# Patient Record
Sex: MALE | URBAN - METROPOLITAN AREA
[De-identification: names, ages, dates, MRNs, and addresses within clinical notes are randomized per-mention and may not be internally consistent; named-entity substitution may affect disease eponyms.]

---

## 2020-06-01 ENCOUNTER — OFFICE VISIT (OUTPATIENT)
Dept: URBAN - METROPOLITAN AREA CLINIC 12 | Facility: CLINIC | Age: 80
End: 2020-06-01

## 2021-09-08 ENCOUNTER — OFFICE VISIT (OUTPATIENT)
Dept: URBAN - METROPOLITAN AREA CLINIC 23 | Facility: CLINIC | Age: 81
End: 2021-09-08

## 2021-09-09 ENCOUNTER — OFFICE VISIT (OUTPATIENT)
Dept: URBAN - METROPOLITAN AREA CLINIC 12 | Facility: CLINIC | Age: 81
End: 2021-09-09
Payer: MEDICARE

## 2021-09-09 ENCOUNTER — OFFICE VISIT (OUTPATIENT)
Dept: URBAN - METROPOLITAN AREA CLINIC 12 | Facility: CLINIC | Age: 81
End: 2021-09-09

## 2021-09-09 VITALS
HEIGHT: 65 IN | HEART RATE: 90 BPM | WEIGHT: 154.2 LBS | SYSTOLIC BLOOD PRESSURE: 144 MMHG | DIASTOLIC BLOOD PRESSURE: 76 MMHG | TEMPERATURE: 97.1 F | BODY MASS INDEX: 25.69 KG/M2

## 2021-09-09 DIAGNOSIS — R09.89 GLOBUS SENSATION: ICD-10-CM

## 2021-09-09 DIAGNOSIS — R14.0 BLOATING AND GAS: ICD-10-CM

## 2021-09-09 DIAGNOSIS — K11.7 HYPERSALIVATION: ICD-10-CM

## 2021-09-09 DIAGNOSIS — K58.9 IBS (IRRITABLE BOWEL SYNDROME): ICD-10-CM

## 2021-09-09 PROBLEM — 235675006: Status: ACTIVE | Noted: 2021-09-09

## 2021-09-09 PROCEDURE — 99204 OFFICE O/P NEW MOD 45 MIN: CPT | Performed by: INTERNAL MEDICINE

## 2021-09-09 RX ORDER — BENAZEPRIL HYDROCHLORIDE 40 MG/1
1 TABLET TABLET, FILM COATED ORAL
Status: ACTIVE | COMMUNITY

## 2021-09-09 RX ORDER — PANTOPRAZOLE SODIUM 40 MG/1
1 TABLET TABLET, DELAYED RELEASE ORAL
Status: ACTIVE | COMMUNITY

## 2021-09-09 NOTE — HPI-TODAY'S VISIT:
81-year-old  male presented today for second opinion for persistent hypersalivation burning sensation in the throat and belching.  He has the symptoms for more than 5 years he was seen in [facility] by Dr. Tapia had upper endoscopy with pH study and manometry in 2019 both test were normal no evidence of reflux.  He had a EGD showed mild gastritis he was given pantoprazole 40 mg a day with no significant improvement he continued to have the symptoms mostly when he initiated the swallow he started having hypersalivation feeling of burning sensation in the throat.  But no vomiting no dysphagia.  He was seen by multiple gastroenterologist he had multiple trial of PPI with no improvement.  He had gastric emptying study per his report and was normal.  He reports  gas and bloating especially after large meal Patient brought records with him and I reviewed all his records from [facility]

## 2021-09-09 NOTE — PHYSICAL EXAM HENT:
Head, normocephalic, atraumatic, Face, Face within normal limits, Ears, External ears within normal limits, Nose/Nasopharynx, External nose  normal appearance, nares patent, no nasal discharge, Mouth and Throat, Oral cavity appearance normal, Breath odor normal, Lips, Appearance normal
100.5/fahrenheit

## 2021-09-11 PROBLEM — 10743008 IRRITABLE BOWEL SYNDROME: Status: ACTIVE | Noted: 2021-09-11

## 2022-07-09 ENCOUNTER — TELEPHONE ENCOUNTER (OUTPATIENT)
Dept: URBAN - METROPOLITAN AREA CLINIC 121 | Facility: CLINIC | Age: 82
End: 2022-07-09

## 2022-07-09 RX ORDER — FLUCONAZOLE 100 MG/1
TABLET ORAL ONCE A DAY
Refills: 0 | OUTPATIENT
Start: 2013-06-19 | End: 2013-06-19

## 2022-07-09 RX ORDER — ESOMEPRAZOLE MAGNESIUM 40 MG
CAPSULE,DELAYED RELEASE (ENTERIC COATED) ORAL TWICE A DAY
Refills: 0 | OUTPATIENT
Start: 2015-01-30 | End: 2018-10-30

## 2022-07-09 RX ORDER — FLUTICASONE PROPIONATE 50 UG/1
SPRAY, METERED NASAL AS NEEDED
Refills: 0 | OUTPATIENT
Start: 2018-10-30 | End: 2018-10-30

## 2022-07-09 RX ORDER — BENAZEPRIL HYDROCHLORIDE 40 MG/1
TABLET, FILM COATED ORAL
Refills: 0 | OUTPATIENT
Start: 2013-05-28 | End: 2015-02-04

## 2022-07-09 RX ORDER — BENAZEPRIL HYDROCHLORIDE 40 MG/1
TABLET, FILM COATED ORAL ONCE A DAY
Refills: 0 | OUTPATIENT
Start: 2018-10-30 | End: 2018-10-30

## 2022-07-09 RX ORDER — SUCRALFATE 1 G/10ML
SUSPENSION ORAL
Refills: 0 | OUTPATIENT
Start: 2018-10-30 | End: 2018-12-05

## 2022-07-09 RX ORDER — ESOMEPRAZOLE MAGNESIUM 40 MG
CAPSULE,DELAYED RELEASE (ENTERIC COATED) ORAL TWICE A DAY
Refills: 0 | OUTPATIENT
Start: 2018-10-30 | End: 2018-10-30

## 2022-07-09 RX ORDER — AMOXICILLIN 875 MG/1
TABLET, COATED ORAL
Refills: 0 | OUTPATIENT
Start: 2013-05-28 | End: 2013-06-19

## 2022-07-09 RX ORDER — BENAZEPRIL HYDROCHLORIDE 40 MG/1
TABLET, FILM COATED ORAL ONCE A DAY
Refills: 0 | OUTPATIENT
Start: 2015-02-04 | End: 2018-10-30

## 2022-07-09 RX ORDER — FAMOTIDINE 10 MG
TABLET ORAL ONCE A DAY
Refills: 0 | OUTPATIENT
Start: 2018-10-30 | End: 2018-12-05

## 2022-07-09 RX ORDER — FLUTICASONE PROPIONATE 50 UG/1
SPRAY, METERED NASAL AS NEEDED
Refills: 0 | OUTPATIENT
Start: 2015-01-30 | End: 2018-10-30

## 2022-07-09 RX ORDER — OMEPRAZOLE 20 MG/1
TABLET, DELAYED RELEASE ORAL
Refills: 0 | OUTPATIENT
Start: 2013-05-28 | End: 2015-02-04

## 2022-07-09 RX ORDER — METFORMIN HCL 500 MG/1
TABLET ORAL ONCE A DAY
Refills: 0 | OUTPATIENT
Start: 2018-10-30 | End: 2018-10-30

## 2022-07-09 RX ORDER — OMEPRAZOLE 20 MG/1
CAPSULE, DELAYED RELEASE ORAL ONCE A DAY
Refills: 0 | OUTPATIENT
Start: 2018-10-30 | End: 2018-12-05

## 2022-07-09 RX ORDER — METFORMIN HCL 500 MG/1
TABLET ORAL ONCE A DAY
Refills: 0 | OUTPATIENT
Start: 2015-01-13 | End: 2018-10-30

## 2022-07-10 ENCOUNTER — TELEPHONE ENCOUNTER (OUTPATIENT)
Dept: URBAN - METROPOLITAN AREA CLINIC 121 | Facility: CLINIC | Age: 82
End: 2022-07-10

## 2022-07-10 RX ORDER — OMEPRAZOLE 20 MG/1
TABLET, DELAYED RELEASE ORAL TWICE A DAY
Refills: 3 | Status: ACTIVE | COMMUNITY
Start: 2013-07-31

## 2022-07-10 RX ORDER — BENAZEPRIL HYDROCHLORIDE 40 MG/1
TABLET, FILM COATED ORAL ONCE A DAY
Refills: 0 | Status: ACTIVE | COMMUNITY
Start: 2018-10-30

## 2022-07-10 RX ORDER — FLUCONAZOLE 100 MG/1
TABLET ORAL ONCE A DAY
Refills: 0 | Status: ACTIVE | COMMUNITY
Start: 2013-06-19

## 2022-07-10 RX ORDER — FLUTICASONE PROPIONATE 50 UG/1
SPRAY, METERED NASAL AS NEEDED
Refills: 0 | Status: ACTIVE | COMMUNITY
Start: 2018-10-30

## 2022-07-10 RX ORDER — METFORMIN HCL 500 MG/1
TABLET ORAL ONCE A DAY
Refills: 0 | Status: ACTIVE | COMMUNITY
Start: 2018-11-20

## 2022-07-10 RX ORDER — METFORMIN HCL 500 MG/1
TABLET ORAL ONCE A DAY
Refills: 0 | Status: ACTIVE | COMMUNITY
Start: 2018-10-30

## 2023-08-24 ENCOUNTER — DASHBOARD ENCOUNTERS (OUTPATIENT)
Age: 83
End: 2023-08-24

## 2023-08-24 ENCOUNTER — OFFICE VISIT (OUTPATIENT)
Dept: URBAN - METROPOLITAN AREA CLINIC 78 | Facility: CLINIC | Age: 83
End: 2023-08-24
Payer: MEDICARE

## 2023-08-24 VITALS
RESPIRATION RATE: 15 BRPM | HEART RATE: 76 BPM | TEMPERATURE: 98.2 F | HEIGHT: 65 IN | DIASTOLIC BLOOD PRESSURE: 72 MMHG | SYSTOLIC BLOOD PRESSURE: 184 MMHG | WEIGHT: 147 LBS | BODY MASS INDEX: 24.49 KG/M2

## 2023-08-24 DIAGNOSIS — K62.5 RECTAL BLEEDING: ICD-10-CM

## 2023-08-24 DIAGNOSIS — Z80.0 FAMILY HISTORY OF PANCREATIC CANCER: ICD-10-CM

## 2023-08-24 DIAGNOSIS — R19.8 GLOBUS SENSATION: ICD-10-CM

## 2023-08-24 DIAGNOSIS — K11.7 HYPERSALIVATION: ICD-10-CM

## 2023-08-24 PROCEDURE — 99204 OFFICE O/P NEW MOD 45 MIN: CPT | Performed by: INTERNAL MEDICINE

## 2023-08-24 PROCEDURE — 99244 OFF/OP CNSLTJ NEW/EST MOD 40: CPT | Performed by: INTERNAL MEDICINE

## 2023-08-24 RX ORDER — METFORMIN HCL 500 MG/1
TABLET ORAL ONCE A DAY
Refills: 0 | Status: ACTIVE | COMMUNITY
Start: 2018-10-30

## 2023-08-24 RX ORDER — BENAZEPRIL HYDROCHLORIDE 40 MG/1
TABLET, FILM COATED ORAL ONCE A DAY
Refills: 0 | Status: ON HOLD | COMMUNITY
Start: 2018-10-30

## 2023-08-24 RX ORDER — OMEPRAZOLE 20 MG/1
TABLET, DELAYED RELEASE ORAL TWICE A DAY
Refills: 3 | Status: ON HOLD | COMMUNITY
Start: 2013-07-31

## 2023-08-24 RX ORDER — SODIUM PICOSULFATE, MAGNESIUM OXIDE, AND ANHYDROUS CITRIC ACID 12; 3.5; 1 G/175ML; G/175ML; MG/175ML
175 ML THE FIRST DOSE THE EVENING BEFORE AND SECOND DOSE THE MORNING OF COLONOSCOPY LIQUID ORAL ONCE A DAY
Qty: 350 | OUTPATIENT
Start: 2023-08-24 | End: 2023-08-26

## 2023-08-24 RX ORDER — BENAZEPRIL HYDROCHLORIDE 40 MG/1
1 TABLET TABLET, FILM COATED ORAL
Status: ON HOLD | COMMUNITY

## 2023-08-24 RX ORDER — FLUTICASONE PROPIONATE 50 UG/1
SPRAY, METERED NASAL AS NEEDED
Refills: 0 | Status: ON HOLD | COMMUNITY
Start: 2018-10-30

## 2023-08-24 RX ORDER — FLUCONAZOLE 100 MG/1
TABLET ORAL ONCE A DAY
Refills: 0 | Status: ON HOLD | COMMUNITY
Start: 2013-06-19

## 2023-08-24 RX ORDER — PANTOPRAZOLE SODIUM 40 MG/1
1 TABLET TABLET, DELAYED RELEASE ORAL
Status: ACTIVE | COMMUNITY

## 2023-08-24 NOTE — HPI-TODAY'S VISIT:
The patient was referred to us by Dr. Tuan Salazar for . A copy of this note will be sent to the referring physician.   The patient last had a colonoscopy some 6-7 years ago. There is no FH of colon cancer or colon polyps. His sister had pancreatic cancer and his mother liver cancer.  He reports episode of rectal bleeding some 2 weeks ago. This is new for him. He describes it as an isolated episode of pinkish tinged mucus.  He has also had increased bloating. The patient has no pertinent additional complaints of abdominal pain, constipation, diarrhea, anorexia or unintentional weight loss.   Regarding any upper GI complaints, the patient has not had heartburn, nausea, vomiting or dysphagia.   The patient does not take blood thinners.  They deny any CP or DEMARCO.   He has been seen in the past by Dr. Edy Smith for a second opinion  regarding persistent hypersalivation/ burning sensation in the throat and belching, present for > 5 years. He had also been seen in the [facility] by Dr. Tapia where he had upper endoscopy with pH study and manometry in 2019 (both test were normal without evidence of reflux).    He was given pantoprazole 40 mg a day with no significant improvement he continued to have the symptoms mostly when he initiated the swallow he started having hypersalivation feeling of burning sensation in the throat. ENT evaluation was also unremarkable. He states his last EGD was done in Feb 2022. I do not have that report for review.  Summary of prior workup: - EGD in 2018 at : mild gastritis and duodenitis. [facility]. HARMAN study performed. He was seen by multiple gastroenterologist he had multiple trial of PPI with no improvement.   - Gastric emptying study per his report normal.

## 2023-09-29 ENCOUNTER — CLAIMS CREATED FROM THE CLAIM WINDOW (OUTPATIENT)
Dept: URBAN - METROPOLITAN AREA CLINIC 4 | Facility: CLINIC | Age: 83
End: 2023-09-29
Payer: MEDICARE

## 2023-09-29 ENCOUNTER — OUT OF OFFICE VISIT (OUTPATIENT)
Dept: URBAN - METROPOLITAN AREA SURGERY CENTER 15 | Facility: SURGERY CENTER | Age: 83
End: 2023-09-29
Payer: MEDICARE

## 2023-09-29 DIAGNOSIS — K64.1 INTERNAL HEMORRHOIDS, GRADE II: ICD-10-CM

## 2023-09-29 DIAGNOSIS — K62.5 RECTAL BLEEDING: ICD-10-CM

## 2023-09-29 DIAGNOSIS — K63.89 APPENDICITIS EPIPLOICA: ICD-10-CM

## 2023-09-29 DIAGNOSIS — K62.5 ANAL BLEEDING: ICD-10-CM

## 2023-09-29 DIAGNOSIS — D12.4 ADENOMA OF DESCENDING COLON: ICD-10-CM

## 2023-09-29 DIAGNOSIS — K62.1 ANAL AND RECTAL POLYP: ICD-10-CM

## 2023-09-29 DIAGNOSIS — K62.1 POLYP OF RECTUM: ICD-10-CM

## 2023-09-29 DIAGNOSIS — D12.2 BENIGN NEOPLASM OF ASCENDING COLON: ICD-10-CM

## 2023-09-29 DIAGNOSIS — D12.2 ADENOMA OF ASCENDING COLON: ICD-10-CM

## 2023-09-29 DIAGNOSIS — D12.3 ADENOMA OF TRANSVERSE COLON: ICD-10-CM

## 2023-09-29 DIAGNOSIS — K63.5 BENIGN COLON POLYPS: ICD-10-CM

## 2023-09-29 PROCEDURE — 45380 COLONOSCOPY AND BIOPSY: CPT | Performed by: INTERNAL MEDICINE

## 2023-09-29 PROCEDURE — 00811 ANES LWR INTST NDSC NOS: CPT | Performed by: NURSE ANESTHETIST, CERTIFIED REGISTERED

## 2023-09-29 PROCEDURE — G8907 PT DOC NO EVENTS ON DISCHARG: HCPCS | Performed by: INTERNAL MEDICINE

## 2023-09-29 PROCEDURE — 88305 TISSUE EXAM BY PATHOLOGIST: CPT | Performed by: PATHOLOGY

## 2023-09-29 PROCEDURE — 45385 COLONOSCOPY W/LESION REMOVAL: CPT | Performed by: INTERNAL MEDICINE

## 2023-09-29 RX ORDER — BENAZEPRIL HYDROCHLORIDE 40 MG/1
TABLET, FILM COATED ORAL ONCE A DAY
Refills: 0 | Status: ON HOLD | COMMUNITY
Start: 2018-10-30

## 2023-09-29 RX ORDER — METFORMIN HCL 500 MG/1
TABLET ORAL ONCE A DAY
Refills: 0 | Status: ACTIVE | COMMUNITY
Start: 2018-10-30

## 2023-09-29 RX ORDER — FLUTICASONE PROPIONATE 50 UG/1
SPRAY, METERED NASAL AS NEEDED
Refills: 0 | Status: ON HOLD | COMMUNITY
Start: 2018-10-30

## 2023-09-29 RX ORDER — PANTOPRAZOLE SODIUM 40 MG/1
1 TABLET TABLET, DELAYED RELEASE ORAL
Status: ACTIVE | COMMUNITY

## 2023-09-29 RX ORDER — FLUCONAZOLE 100 MG/1
TABLET ORAL ONCE A DAY
Refills: 0 | Status: ON HOLD | COMMUNITY
Start: 2013-06-19

## 2023-09-29 RX ORDER — BENAZEPRIL HYDROCHLORIDE 40 MG/1
1 TABLET TABLET, FILM COATED ORAL
Status: ON HOLD | COMMUNITY

## 2023-09-29 RX ORDER — OMEPRAZOLE 20 MG/1
TABLET, DELAYED RELEASE ORAL TWICE A DAY
Refills: 3 | Status: ON HOLD | COMMUNITY
Start: 2013-07-31

## 2023-10-11 ENCOUNTER — TELEPHONE ENCOUNTER (OUTPATIENT)
Dept: URBAN - METROPOLITAN AREA CLINIC 78 | Facility: CLINIC | Age: 83
End: 2023-10-11

## 2023-10-11 RX ORDER — SUCRALFATE 1 G/1
1 TABLET ON AN EMPTY STOMACH TABLET ORAL
Qty: 60 TABLET | Refills: 3 | OUTPATIENT
Start: 2023-11-03 | End: 2024-03-01